# Patient Record
Sex: FEMALE | Race: WHITE | NOT HISPANIC OR LATINO | URBAN - METROPOLITAN AREA
[De-identification: names, ages, dates, MRNs, and addresses within clinical notes are randomized per-mention and may not be internally consistent; named-entity substitution may affect disease eponyms.]

---

## 2017-08-21 ENCOUNTER — NEW PATIENT (OUTPATIENT)
Dept: URBAN - METROPOLITAN AREA CLINIC 44 | Facility: CLINIC | Age: 67
End: 2017-08-21
Payer: MEDICARE

## 2017-08-21 DIAGNOSIS — H25.813 COMBINED FORMS OF AGE-RELATED CATARACT, BILATERAL: Primary | ICD-10-CM

## 2017-08-21 PROCEDURE — 92004 COMPRE OPH EXAM NEW PT 1/>: CPT | Performed by: OPTOMETRIST

## 2017-08-21 PROCEDURE — 92134 CPTRZ OPH DX IMG PST SGM RTA: CPT | Performed by: OPTOMETRIST

## 2017-08-21 ASSESSMENT — VISUAL ACUITY
OD: 20/70
OS: 20/20

## 2017-08-21 ASSESSMENT — INTRAOCULAR PRESSURE
OD: 12
OS: 13

## 2017-08-21 ASSESSMENT — KERATOMETRY
OS: 42.88
OD: 42.50

## 2017-09-25 ENCOUNTER — Encounter (OUTPATIENT)
Dept: URBAN - METROPOLITAN AREA CLINIC 44 | Facility: CLINIC | Age: 67
End: 2017-09-25
Payer: MEDICARE

## 2017-09-25 DIAGNOSIS — Z01.818 ENCOUNTER FOR OTHER PREPROCEDURAL EXAMINATION: Primary | ICD-10-CM

## 2017-09-25 PROCEDURE — 99213 OFFICE O/P EST LOW 20 MIN: CPT | Performed by: PHYSICIAN ASSISTANT

## 2017-09-25 RX ORDER — LOSARTAN POTASSIUM 100 MG/1
100 MG TABLET ORAL
Qty: 0 | Refills: 0 | Status: ACTIVE
Start: 2017-09-25

## 2017-09-25 RX ORDER — ASPIRIN 81 MG/1
81 MG TABLET, COATED ORAL
Qty: 0 | Refills: 0 | Status: ACTIVE
Start: 2017-09-25

## 2017-09-25 RX ORDER — VITAMIN D, TAB 1000IU (100/BT) 25 MCG
TAB ORAL
Qty: 0 | Refills: 0 | Status: INACTIVE
Start: 2017-09-25 | End: 2019-12-09

## 2017-09-25 RX ORDER — CYANOCOBALAMIN TAB 1000 MCG 1000 MCG
TAB ORAL
Qty: 0 | Refills: 0 | Status: INACTIVE
Start: 2017-09-25 | End: 2019-12-09

## 2017-09-25 RX ORDER — SIMVASTATIN 20 MG/1
20 MG TABLET, FILM COATED ORAL
Qty: 0 | Refills: 0 | Status: ACTIVE
Start: 2017-09-25

## 2017-10-05 ENCOUNTER — FOLLOW UP ESTABLISHED (OUTPATIENT)
Dept: URBAN - METROPOLITAN AREA CLINIC 44 | Facility: CLINIC | Age: 67
End: 2017-10-05
Payer: MEDICARE

## 2017-10-05 DIAGNOSIS — H43.811 VITREOUS DEGENERATION, RIGHT EYE: ICD-10-CM

## 2017-10-05 DIAGNOSIS — H25.811 COMBINED FORMS OF AGE-RELATED CATARACT, RIGHT EYE: Primary | ICD-10-CM

## 2017-10-05 DIAGNOSIS — H25.812 COMBINED FORMS OF AGE-RELATED CATARACT, LEFT EYE: ICD-10-CM

## 2017-10-05 DIAGNOSIS — Z86.73 OLD CVA: ICD-10-CM

## 2017-10-05 DIAGNOSIS — H34.8112 CENTRAL RETINAL VEIN OCCLUSION, RIGHT EYE, STABLE: ICD-10-CM

## 2017-10-05 PROCEDURE — 92014 COMPRE OPH EXAM EST PT 1/>: CPT | Performed by: OPHTHALMOLOGY

## 2017-10-05 ASSESSMENT — INTRAOCULAR PRESSURE
OS: 17
OD: 17

## 2017-10-06 ENCOUNTER — Encounter (OUTPATIENT)
Dept: URBAN - METROPOLITAN AREA CLINIC 44 | Facility: CLINIC | Age: 67
End: 2017-10-06
Payer: MEDICARE

## 2017-10-06 PROCEDURE — 92083 EXTENDED VISUAL FIELD XM: CPT | Performed by: OPHTHALMOLOGY

## 2017-10-10 ENCOUNTER — SURGERY (OUTPATIENT)
Dept: URBAN - METROPOLITAN AREA SURGERY 19 | Facility: SURGERY | Age: 67
End: 2017-10-10
Payer: MEDICARE

## 2017-10-10 PROCEDURE — 66984 XCAPSL CTRC RMVL W/O ECP: CPT | Performed by: OPHTHALMOLOGY

## 2017-10-11 ENCOUNTER — POST OP (OUTPATIENT)
Dept: URBAN - METROPOLITAN AREA CLINIC 44 | Facility: CLINIC | Age: 67
End: 2017-10-11

## 2017-10-11 PROCEDURE — 99024 POSTOP FOLLOW-UP VISIT: CPT | Performed by: OPTOMETRIST

## 2017-10-20 ENCOUNTER — POST OP (OUTPATIENT)
Dept: URBAN - METROPOLITAN AREA CLINIC 44 | Facility: CLINIC | Age: 67
End: 2017-10-20

## 2017-10-20 PROCEDURE — 99024 POSTOP FOLLOW-UP VISIT: CPT | Performed by: OPTOMETRIST

## 2017-10-20 ASSESSMENT — INTRAOCULAR PRESSURE
OD: 10
OS: 10

## 2017-10-20 ASSESSMENT — VISUAL ACUITY
OS: 20/20
OD: 20/70

## 2017-11-17 ENCOUNTER — POST OP (OUTPATIENT)
Dept: URBAN - METROPOLITAN AREA CLINIC 51 | Facility: CLINIC | Age: 67
End: 2017-11-17
Payer: MEDICARE

## 2017-11-17 DIAGNOSIS — Z96.1 PRESENCE OF INTRAOCULAR LENS: Primary | ICD-10-CM

## 2017-11-17 PROCEDURE — 99024 POSTOP FOLLOW-UP VISIT: CPT | Performed by: OPTOMETRIST

## 2017-11-17 PROCEDURE — 92015 DETERMINE REFRACTIVE STATE: CPT | Performed by: OPTOMETRIST

## 2017-11-17 ASSESSMENT — VISUAL ACUITY
OS: 20/20
OD: 20/70

## 2017-11-17 ASSESSMENT — INTRAOCULAR PRESSURE
OD: 13
OS: 14

## 2018-11-09 ENCOUNTER — FOLLOW UP ESTABLISHED (OUTPATIENT)
Dept: URBAN - METROPOLITAN AREA CLINIC 44 | Facility: CLINIC | Age: 68
End: 2018-11-09
Payer: MEDICARE

## 2018-11-09 PROCEDURE — 92134 CPTRZ OPH DX IMG PST SGM RTA: CPT | Performed by: OPTOMETRIST

## 2018-11-09 PROCEDURE — 92014 COMPRE OPH EXAM EST PT 1/>: CPT | Performed by: OPTOMETRIST

## 2018-11-09 ASSESSMENT — KERATOMETRY
OD: 42.25
OS: 43.00

## 2018-11-09 ASSESSMENT — VISUAL ACUITY
OD: 20/70
OS: 20/20

## 2018-11-09 ASSESSMENT — INTRAOCULAR PRESSURE
OS: 16
OD: 14

## 2019-12-09 ENCOUNTER — FOLLOW UP ESTABLISHED (OUTPATIENT)
Dept: URBAN - METROPOLITAN AREA CLINIC 44 | Facility: CLINIC | Age: 69
End: 2019-12-09
Payer: MEDICARE

## 2019-12-09 DIAGNOSIS — H25.12 AGE-RELATED NUCLEAR CATARACT, LEFT EYE: Primary | ICD-10-CM

## 2019-12-09 PROCEDURE — 92012 INTRM OPH EXAM EST PATIENT: CPT | Performed by: OPTOMETRIST

## 2019-12-09 PROCEDURE — 92134 CPTRZ OPH DX IMG PST SGM RTA: CPT | Performed by: OPTOMETRIST

## 2019-12-09 PROCEDURE — 92015 DETERMINE REFRACTIVE STATE: CPT | Performed by: OPTOMETRIST

## 2019-12-09 PROCEDURE — 92014 COMPRE OPH EXAM EST PT 1/>: CPT | Performed by: OPTOMETRIST

## 2019-12-09 ASSESSMENT — KERATOMETRY
OD: 42.50
OS: 43.63

## 2019-12-09 ASSESSMENT — VISUAL ACUITY
OD: 20/CF 2'
OS: 20/20

## 2019-12-09 ASSESSMENT — INTRAOCULAR PRESSURE
OS: 10
OD: 10

## 2021-01-26 ENCOUNTER — FOLLOW UP ESTABLISHED (OUTPATIENT)
Dept: URBAN - METROPOLITAN AREA CLINIC 44 | Facility: CLINIC | Age: 71
End: 2021-01-26
Payer: MEDICARE

## 2021-01-26 DIAGNOSIS — H43.313 VITREOUS MEMBRANES AND STRANDS, BILATERAL: ICD-10-CM

## 2021-01-26 PROCEDURE — 92134 CPTRZ OPH DX IMG PST SGM RTA: CPT | Performed by: OPTOMETRIST

## 2021-01-26 PROCEDURE — 92014 COMPRE OPH EXAM EST PT 1/>: CPT | Performed by: OPTOMETRIST

## 2021-01-26 ASSESSMENT — VISUAL ACUITY
OS: 20/25
OD: 20/CF 2'

## 2021-01-26 ASSESSMENT — INTRAOCULAR PRESSURE
OS: 12
OD: 12

## 2021-07-29 ENCOUNTER — OFFICE VISIT (OUTPATIENT)
Dept: URBAN - METROPOLITAN AREA CLINIC 44 | Facility: CLINIC | Age: 71
End: 2021-07-29
Payer: MEDICARE

## 2021-07-29 PROCEDURE — 92014 COMPRE OPH EXAM EST PT 1/>: CPT | Performed by: OPTOMETRIST

## 2021-07-29 PROCEDURE — 92134 CPTRZ OPH DX IMG PST SGM RTA: CPT | Performed by: OPTOMETRIST

## 2021-07-29 ASSESSMENT — VISUAL ACUITY
OS: 20/20
OD: CF 1FT

## 2021-07-29 ASSESSMENT — INTRAOCULAR PRESSURE
OD: 15
OS: 16

## 2021-07-29 ASSESSMENT — KERATOMETRY: OD: 42.25

## 2021-07-29 NOTE — IMPRESSION/PLAN
Impression: Age-related nuclear cataract, left eye Plan: Patient prefers no treatment at this time, will re-evaluate cataract on return visit. Patient will monitor vision changes and contact us with any decrease in vision.

## 2022-08-19 ENCOUNTER — OFFICE VISIT (OUTPATIENT)
Dept: URBAN - METROPOLITAN AREA CLINIC 44 | Facility: CLINIC | Age: 72
End: 2022-08-19
Payer: MEDICARE

## 2022-08-19 DIAGNOSIS — H25.812 COMBINED FORMS OF AGE-RELATED CATARACT, LEFT EYE: Primary | ICD-10-CM

## 2022-08-19 DIAGNOSIS — H34.8112 CENTRAL RETINAL VEIN OCCLUSION, RIGHT EYE, STABLE: ICD-10-CM

## 2022-08-19 PROCEDURE — 92134 CPTRZ OPH DX IMG PST SGM RTA: CPT | Performed by: OPTOMETRIST

## 2022-08-19 PROCEDURE — 99214 OFFICE O/P EST MOD 30 MIN: CPT | Performed by: OPTOMETRIST

## 2022-08-19 ASSESSMENT — KERATOMETRY
OS: 43.25
OD: 42.63

## 2022-08-19 ASSESSMENT — VISUAL ACUITY
OD: CF 3FT
OS: 20/40

## 2022-08-19 ASSESSMENT — INTRAOCULAR PRESSURE
OD: 12
OS: 12

## 2022-08-19 NOTE — IMPRESSION/PLAN
Impression: Combined forms of age-related cataract, left eye: H25.812. Plan:  Discussed cataracts with patient. Discussed treatment options. Surgical treatment is recommended. Surgical risks and benefits discussed. Patient elects surgical treatment. Recommend surgery OS. Patient is candidate/interested in multifocal, toric, standard, LenSx and ORA. Aim OS: plano. Patient will need glasses for full time wear.

## 2022-08-19 NOTE — IMPRESSION/PLAN
Impression: Central retinal vein occlusion, right eye, stable: H34.8112.  Plan: Resolved, PRP, Optic nerve pallor

## 2022-09-12 ENCOUNTER — ADULT PHYSICAL (OUTPATIENT)
Dept: URBAN - METROPOLITAN AREA CLINIC 44 | Facility: CLINIC | Age: 72
End: 2022-09-12
Payer: MEDICARE

## 2022-09-12 DIAGNOSIS — Z01.818 ENCOUNTER FOR OTHER PREPROCEDURAL EXAMINATION: Primary | ICD-10-CM

## 2022-09-12 DIAGNOSIS — H25.12 AGE-RELATED NUCLEAR CATARACT, LEFT EYE: Primary | ICD-10-CM

## 2022-09-12 PROCEDURE — 99203 OFFICE O/P NEW LOW 30 MIN: CPT | Performed by: PHYSICIAN ASSISTANT

## 2022-09-12 ASSESSMENT — PACHYMETRY
OD: 23.91
OD: 4.90
OS: 23.66
OS: 3.35

## 2022-10-31 ENCOUNTER — OFFICE VISIT (OUTPATIENT)
Dept: URBAN - METROPOLITAN AREA CLINIC 44 | Facility: CLINIC | Age: 72
End: 2022-10-31
Payer: MEDICARE

## 2022-10-31 DIAGNOSIS — H34.8112 CENTRAL RETINAL VEIN OCCLUSION, RIGHT EYE, STABLE: ICD-10-CM

## 2022-10-31 DIAGNOSIS — Z86.73 OLD CVA: ICD-10-CM

## 2022-10-31 DIAGNOSIS — H43.811 VITREOUS DEGENERATION, RIGHT EYE: ICD-10-CM

## 2022-10-31 DIAGNOSIS — H25.812 COMBINED FORMS OF AGE-RELATED CATARACT, LEFT EYE: Primary | ICD-10-CM

## 2022-10-31 PROCEDURE — 99204 OFFICE O/P NEW MOD 45 MIN: CPT | Performed by: OPHTHALMOLOGY

## 2022-10-31 ASSESSMENT — INTRAOCULAR PRESSURE
OD: 13
OS: 14

## 2022-10-31 NOTE — IMPRESSION/PLAN
Impression: Old CVA Plan: will obtain vft;  Discussed with patient, understands this may limit vision after surgery. Reviewed VFT from 2017.

## 2022-10-31 NOTE — IMPRESSION/PLAN
Impression: Combined forms of age-related cataract, left eye: H25.812. Plan: (( AIM -0.25 OS only (second eye); Monocular OS , TRIMOXI, NO ORA OS, NO LRI OS: Declined AMP, NOTE LENS TYPE: [id number],  DENSE, cortical possible trypan, epi possible iris stretch, PSC  )) Discussed cataract diagnosis with the patient. Risks and benefits of surgical treatment were discussed and understood. Patient elects surgical treatment. Premium options discussed but patient declines and is ok with using glasses after surgery. Patient states is bothered by glare and hazy vision strongly desires to proceed with cataract sx. MONOCULAR OS. Patient desires Surgery OS only ( second eye). Discussed with pt the need for glasses after surgery. Discussed monocular precautions with patient. Advised pt to wear protective eyewear . Understands higher risk of complication and delayed healing due to dense cataract. Discussed higher risks with smaller pupil and discussed iris stretch and higher risks of bleeding. It has been explained to patient that patient must carefully weigh the risks vs. benefits of having surgery on the better seeing eye. It has been explained to patient that patient must carefully weigh the risks vs. benefits of having surgery on the better seeing eye. Discussed monocular precautions with patient. Advised pt to wear protective eyewear.

## 2022-10-31 NOTE — IMPRESSION/PLAN
Impression: Central retinal vein occlusion, right eye, stable: H34.8112. Plan: Discussed signs and symptoms of  (flashes,floaters, curtain, loss of vision) as precaution. Patient instructed to call or return to clinic if condition gets worse. Discussed with patient, understands this may limit vision after surgery. Followed by Dr. Fransisco Preciado Discussed monocular precautions with patient. Advised pt to wear protective eyewear.

## 2022-11-01 ENCOUNTER — ADULT PHYSICAL (OUTPATIENT)
Dept: URBAN - METROPOLITAN AREA CLINIC 44 | Facility: CLINIC | Age: 72
End: 2022-11-01
Payer: MEDICARE

## 2022-11-01 DIAGNOSIS — Z01.818 ENCOUNTER FOR OTHER PREPROCEDURAL EXAMINATION: Primary | ICD-10-CM

## 2022-11-01 DIAGNOSIS — H25.12 AGE-RELATED NUCLEAR CATARACT, LEFT EYE: ICD-10-CM

## 2022-11-01 PROCEDURE — 99213 OFFICE O/P EST LOW 20 MIN: CPT | Performed by: PHYSICIAN ASSISTANT

## 2022-11-10 ENCOUNTER — SURGERY (OUTPATIENT)
Dept: URBAN - METROPOLITAN AREA SURGERY 19 | Facility: SURGERY | Age: 72
End: 2022-11-10
Payer: MEDICARE

## 2022-11-10 DIAGNOSIS — H25.12 AGE-RELATED NUCLEAR CATARACT, LEFT EYE: Primary | ICD-10-CM

## 2022-11-10 PROCEDURE — 66984 XCAPSL CTRC RMVL W/O ECP: CPT | Performed by: OPHTHALMOLOGY

## 2022-11-11 ENCOUNTER — POST-OPERATIVE VISIT (OUTPATIENT)
Dept: URBAN - METROPOLITAN AREA CLINIC 44 | Facility: CLINIC | Age: 72
End: 2022-11-11
Payer: MEDICARE

## 2022-11-11 DIAGNOSIS — Z48.810 ENCOUNTER FOR SURGICAL AFTERCARE FOLLOWING SURGERY ON A SENSE ORGAN: Primary | ICD-10-CM

## 2022-11-11 PROCEDURE — 99024 POSTOP FOLLOW-UP VISIT: CPT | Performed by: OPTOMETRIST

## 2022-11-11 ASSESSMENT — INTRAOCULAR PRESSURE: OS: 12

## 2022-12-13 ENCOUNTER — POST-OPERATIVE VISIT (OUTPATIENT)
Dept: URBAN - METROPOLITAN AREA CLINIC 44 | Facility: CLINIC | Age: 72
End: 2022-12-13
Payer: MEDICARE

## 2022-12-13 DIAGNOSIS — H52.4 PRESBYOPIA: ICD-10-CM

## 2022-12-13 DIAGNOSIS — Z48.810 ENCOUNTER FOR SURGICAL AFTERCARE FOLLOWING SURGERY ON A SENSE ORGAN: Primary | ICD-10-CM

## 2022-12-13 PROCEDURE — 99024 POSTOP FOLLOW-UP VISIT: CPT | Performed by: OPTOMETRIST

## 2022-12-13 PROCEDURE — 92134 CPTRZ OPH DX IMG PST SGM RTA: CPT | Performed by: OPTOMETRIST

## 2022-12-13 ASSESSMENT — VISUAL ACUITY
OD: CF 5FT
OS: 20/20

## 2022-12-13 ASSESSMENT — INTRAOCULAR PRESSURE
OD: 15
OS: 14

## 2022-12-13 NOTE — IMPRESSION/PLAN
Impression: S/P Cataract Extraction by phacoemulsification with IOL placement OS - 33 Days. Encounter for surgical aftercare following surgery on a sense organ  Z48.960.  Plan: rx ou

## 2024-06-27 ENCOUNTER — OFFICE VISIT (OUTPATIENT)
Dept: URBAN - METROPOLITAN AREA CLINIC 44 | Facility: CLINIC | Age: 74
End: 2024-06-27
Payer: MEDICARE

## 2024-06-27 DIAGNOSIS — H35.371 PUCKERING OF MACULA, RIGHT EYE: ICD-10-CM

## 2024-06-27 DIAGNOSIS — H26.491 OTHER SECONDARY CATARACT, RIGHT EYE: ICD-10-CM

## 2024-06-27 DIAGNOSIS — H34.8112 CENTRAL RETINAL VEIN OCCLUSION, RIGHT EYE, STABLE: Primary | ICD-10-CM

## 2024-06-27 PROCEDURE — 92014 COMPRE OPH EXAM EST PT 1/>: CPT | Performed by: OPTOMETRIST

## 2024-06-27 PROCEDURE — 92134 CPTRZ OPH DX IMG PST SGM RTA: CPT | Performed by: OPTOMETRIST

## 2024-06-27 ASSESSMENT — VISUAL ACUITY
OD: 20/200
OS: 20/20

## 2024-06-27 ASSESSMENT — KERATOMETRY
OD: 42.63
OS: 43.13

## 2024-06-27 ASSESSMENT — INTRAOCULAR PRESSURE
OD: 11
OS: 10

## 2025-08-29 ENCOUNTER — OFFICE VISIT (OUTPATIENT)
Dept: URBAN - METROPOLITAN AREA CLINIC 44 | Facility: CLINIC | Age: 75
End: 2025-08-29
Payer: MEDICARE

## 2025-08-29 DIAGNOSIS — H34.8112 CENTRAL RETINAL VEIN OCCLUSION, RIGHT EYE, STABLE: ICD-10-CM

## 2025-08-29 DIAGNOSIS — H35.371 PUCKERING OF MACULA, RIGHT EYE: Primary | ICD-10-CM

## 2025-08-29 DIAGNOSIS — H26.491 OTHER SECONDARY CATARACT, RIGHT EYE: ICD-10-CM

## 2025-08-29 DIAGNOSIS — H43.313 VITREOUS MEMBRANES AND STRANDS, BILATERAL: ICD-10-CM

## 2025-08-29 PROCEDURE — 92134 CPTRZ OPH DX IMG PST SGM RTA: CPT | Performed by: OPTOMETRIST

## 2025-08-29 PROCEDURE — 92014 COMPRE OPH EXAM EST PT 1/>: CPT | Performed by: OPTOMETRIST

## 2025-08-29 ASSESSMENT — VISUAL ACUITY
OD: 20/500
OS: 20/25

## 2025-08-29 ASSESSMENT — KERATOMETRY
OD: 42.75
OS: 42.88

## 2025-08-29 ASSESSMENT — INTRAOCULAR PRESSURE
OS: 12
OD: 12